# Patient Record
Sex: FEMALE | Race: OTHER | Employment: FULL TIME | URBAN - METROPOLITAN AREA
[De-identification: names, ages, dates, MRNs, and addresses within clinical notes are randomized per-mention and may not be internally consistent; named-entity substitution may affect disease eponyms.]

---

## 2023-08-30 ENCOUNTER — APPOINTMENT (OUTPATIENT)
Dept: GENERAL RADIOLOGY | Facility: HOSPITAL | Age: 63
End: 2023-08-30
Attending: EMERGENCY MEDICINE

## 2023-08-30 ENCOUNTER — APPOINTMENT (OUTPATIENT)
Dept: CT IMAGING | Facility: HOSPITAL | Age: 63
End: 2023-08-30
Attending: EMERGENCY MEDICINE

## 2023-08-30 ENCOUNTER — HOSPITAL ENCOUNTER (INPATIENT)
Facility: HOSPITAL | Age: 63
LOS: 4 days | Discharge: HOME | End: 2023-09-03
Attending: EMERGENCY MEDICINE | Admitting: HOSPITALIST

## 2023-08-30 PROBLEM — A41.9 SEPSIS DUE TO UNDETERMINED ORGANISM (HCC): Status: ACTIVE | Noted: 2023-08-30

## 2023-08-30 PROBLEM — E11.10 DIABETIC KETOACIDOSIS WITHOUT COMA ASSOCIATED WITH TYPE 2 DIABETES MELLITUS (HCC): Status: ACTIVE | Noted: 2023-08-30

## 2023-08-30 PROBLEM — R50.9 FEBRILE ILLNESS: Status: ACTIVE | Noted: 2023-08-30

## 2023-08-30 NOTE — ED INITIAL ASSESSMENT (HPI)
Patient presents to ED with fever, headache, and abd pain since yesterday morning. Patient vomited x1 en route to ED. Per medics, patient received 4mg zofran IV.

## 2023-08-31 NOTE — PLAN OF CARE
Received pt on alert and oriented, on insulin drip. PT on bedrest. Endocrine evaluate pt and transition off drip. Diet order placed. Pt's appetite is fair with nausea resolving. Bed locked and in lowest position, call light within reach. Family at bedside and updated on plan of care. Problem: Patient Centered Care  Goal: Patient preferences are identified and integrated in the patient's plan of care  Description: Interventions:  - What would you like us to know as we care for you?  I am [place]ic speaking  - Provide timely, complete, and accurate information to patient/family  - Incorporate patient and family knowledge, values, beliefs, and cultural backgrounds into the planning and delivery of care  - Encourage patient/family to participate in care and decision-making at the level they choose  - Honor patient and family perspectives and choices  Outcome: Progressing     Problem: Diabetes/Glucose Control  Goal: Glucose maintained within prescribed range  Description: INTERVENTIONS:  - Monitor Blood Glucose as ordered  - Assess for signs and symptoms of hyperglycemia and hypoglycemia  - Administer ordered medications to maintain glucose within target range  - Assess barriers to adequate nutritional intake and initiate nutrition consult as needed  - Instruct patient on self management of diabetes  Outcome: Progressing     Problem: Patient/Family Goals  Goal: Patient/Family Long Term Goal  Description: Patient's Long Term Goal:     Interventions:  -   - See additional Care Plan goals for specific interventions  Outcome: Progressing  Goal: Patient/Family Short Term Goal  Description: Patient's Short Term Goal:     Interventions:   -   - See additional Care Plan goals for specific interventions  Outcome: Progressing     Problem: GASTROINTESTINAL - ADULT  Goal: Minimal or absence of nausea and vomiting  Description: INTERVENTIONS:  - Maintain adequate hydration with IV or PO as ordered and tolerated  - Nasogastric tube to low intermittent suction as ordered  - Evaluate effectiveness of ordered antiemetic medications  - Provide nonpharmacologic comfort measures as appropriate  - Advance diet as tolerated, if ordered  - Obtain nutritional consult as needed  - Evaluate fluid balance  Outcome: Progressing

## 2023-08-31 NOTE — OCCUPATIONAL THERAPY NOTE
Orders received, chart reviewed for OT evaluation. Pt not appropriate to work with today per RN --pt w/ nausea , on insulin drip. Attempt x 2 today.

## 2023-08-31 NOTE — PHYSICAL THERAPY NOTE
PT order received, chart review completed. Attempted to see patient x 2 this morning and afternoon. Per RN pt remains not appropriate for PT evaluation due to nausea, vomiting and remains on insulin drip. Request follow up tomorrow for evaluation.

## 2023-08-31 NOTE — PLAN OF CARE
Patient alert and oriented, Macedonian speaking only. Vital signs stable overnight, afebrile. Patient is on room air, no complaints of shortness of breath. Blood sugars down trending, insulin gtt infusing. K 3.3 this morning, 40 meq being infused.  went home this morning ( 0600) to grab patients medications as him and the patient are unable to recall what medications the patient takes with the dosage, as well as patient pharmacy. Plan of care reviewed, will endorse care. Problem: Patient Centered Care  Goal: Patient preferences are identified and integrated in the patient's plan of care  Description: Interventions:  - What would you like us to know as we care for you?  Home with   - Provide timely, complete, and accurate information to patient/family  - Incorporate patient and family knowledge, values, beliefs, and cultural backgrounds into the planning and delivery of care  - Encourage patient/family to participate in care and decision-making at the level they choose  - Honor patient and family perspectives and choices  Outcome: Progressing     Problem: Diabetes/Glucose Control  Goal: Glucose maintained within prescribed range  Description: INTERVENTIONS:  - Monitor Blood Glucose as ordered  - Assess for signs and symptoms of hyperglycemia and hypoglycemia  - Administer ordered medications to maintain glucose within target range  - Assess barriers to adequate nutritional intake and initiate nutrition consult as needed  - Instruct patient on self management of diabetes  Outcome: Progressing

## 2023-08-31 NOTE — H&P
[facility]    PATIENT'S NAME: Silvio Romero   ATTENDING PHYSICIAN: Serena Mcclendon. Jil Gallegos MD   PATIENT ACCOUNT#:   [id number]    LOCATION:  [address]  MEDICAL RECORD #:   [id number]       YOB: 1960  ADMISSION DATE:       08/30/2023    HISTORY AND PHYSICAL EXAMINATION    CHIEF COMPLAINT:  Diabetic ketoacidosis, fever, rule out infectious etiology. HISTORY OF PRESENT ILLNESS:  Patient is a 14-year-old  female with history of diabetes mellitus type 2 and hypertension, presented to the emergency department for evaluation of intractable nausea, vomiting, and fever since last night. Upon arrival to the emergency room, CBC was unremarkable. Chemistry showed bicarbonate 7, potassium 3.3, sodium 135, glucose 400. GFR is 59. Calculated osmolality of 298. Liver function tests were roughly unremarkable except for mildly elevated alkaline phosphatase of 144. Bilirubin value is normal.  No prior labs to compare. Chest x-ray showed possible small right infrahilar opacity such as from atelectasis or infection. CT scan of the abdomen still pending. Started on IV fluids and insulin drip. Urinalysis still pending. PAST MEDICAL HISTORY:  Obesity and diabetes mellitus type 2. Denies any other medical problems. PAST SURGICAL HISTORY:  Right knee arthroscopic procedure. MEDICATIONS:  She only takes metformin. FAMILY HISTORY:  Positive for diabetes. She has daughter with cholecystectomy. SOCIAL HISTORY:  No tobacco, alcohol, or drug use. Lives with her family. Independent for basic activities of daily living. REVIEW OF SYSTEMS:  Patient is a poor historian, but she reports intractable nausea, vomiting with dry heaving since yesterday with polydipsia and polyuria. She insists that her symptoms are only 3days old. She had fever and chills. When I asked her about abdominal pain, she denies it. No dysuria. Other 12-point review of systems is negative. PHYSICAL EXAMINATION:    GENERAL:  Alert and oriented to time, place, and person. Moderate distress. VITAL SIGNS:  Temperature 103.2; pulse 122, sinus tachycardia; respiratory rate 29; blood pressure 158/60, pulse ox 94% on room air. HEENT:  Atraumatic. Oropharynx clear. Dry mucous membranes. Normal hard and soft palate. Eyes:  Anicteric sclerae. NECK:  Supple. No lymphadenopathy. LUNGS:  Clear to auscultation bilaterally. Normal respiratory effort. HEART:  Regular rate, rhythm. S1 and S2 auscultated. No murmur. ABDOMEN:  Soft, nondistended. Mild tenderness noted right upper quadrant area. EXTREMITIES:  No peripheral edema, clubbing, or cyanosis. NEUROLOGIC:  Motor and sensory intact. Cranial nerves II through XII are intact. ASSESSMENT:    1.   Diabetic ketoacidosis. 2.   Dehydration. 3.   Fever, rule out infectious etiology. CT scan of the abdomen and urinalysis were ordered. Rule out cholecystitis. PLAN:  Patient will be admitted to progressive care unit. IV fluids. Urine and blood cultures. CT scan of the abdomen. Endocrinology consult. Pending imaging studies. Further recommendations for consultation. Monitor her hemodynamic status and electrolytes. IV Zosyn.     Dictated By Christie De Leon MD  d: 08/30/2023 18:50:38  t: 08/30/2023 18:55:13  Job [id number]  /

## 2023-09-01 NOTE — PLAN OF CARE
Problem: Patient Centered Care  Goal: Patient preferences are identified and integrated in the patient's plan of care  Description: Interventions:  - Provide timely, complete, and accurate information to patient/family  - Incorporate patient and family knowledge, values, beliefs, and cultural backgrounds into the planning and delivery of care  - Encourage patient/family to participate in care and decision-making at the level they choose  - Honor patient and family perspectives and choices  Outcome: Progressing     Problem: Diabetes/Glucose Control  Goal: Glucose maintained within prescribed range  Description: INTERVENTIONS:  - Monitor Blood Glucose as ordered  - Assess for signs and symptoms of hyperglycemia and hypoglycemia  - Administer ordered medications to maintain glucose within target range  - Assess barriers to adequate nutritional intake and initiate nutrition consult as needed  - Instruct patient on self management of diabetes  Outcome: Progressing     Problem: GASTROINTESTINAL - ADULT  Goal: Minimal or absence of nausea and vomiting  Description: INTERVENTIONS:  - Maintain adequate hydration with IV or PO as ordered and tolerated  - Nasogastric tube to low intermittent suction as ordered  - Evaluate effectiveness of ordered antiemetic medications  - Provide nonpharmacologic comfort measures as appropriate  - Advance diet as tolerated, if ordered  - Obtain nutritional consult as needed  - Evaluate fluid balance  Outcome: Progressing     Vital signs stable during shift, no acute changes. Monitoring blood sugar. Afebrile during shift. IV abx given per orders. Up to chair today, ambulating, tolerating well. Voiding freely. NSR on the monitor. Orders to transfer, awaiting bed. Safety precautions in place, call light within reach. Bed locked and in lowest position. Educated patient on self administering insulin via [address] , patient needs reinforcement on demonstrating understanding.  Patient and family updated and aware of plan via [address] [facility] .

## 2023-09-01 NOTE — PHYSICAL THERAPY NOTE
PHYSICAL THERAPY EVALUATION - INPATIENT     Room Number: 237/237-A  Evaluation Date: 9/1/2023  Type of Evaluation: Initial   Physician Order: PT Eval and Treat    Presenting Problem: DKA  Co-Morbidities : DM II, HTN  Reason for Therapy: Mobility Dysfunction and Discharge Planning    PHYSICAL THERAPY ASSESSMENT     Patient is a 60year old female admitted 8/30/2023 for DKA. RN cleared pt to participate in PT evaluation this morning, coordinated session with OT. Pt received in bed at start, agreeable to participate. Utilized phone  for communication, pt primarily speaks [place] Spouse present throughout session. At baseline pt lives in one level house with spouse and son. She is indep with mobility and ADLs. She does not drive. No falls. Reviewed role of therapy in acute care setting, goals of session, importance of out of bed mobility and safety precautions. Pt receptive to education. Pt  demonstrated bed mobility and functional transfers with supervision. Gait belt donned prior to mobilization. Pt ambulated 15 ft x 2 without device and CGA. Pt with slow and cautious gait pattern. She remained up in chair at end of session, needs in reach and RN aware of session outcome. Pt is close to her baseline level of mobility, anticipate 1-2 therapy sessions more to progress gait. Patient's current functional deficits include activity tolerance and endurance, which are below the patient's pre-admission status. The patient's Approx Degree of Impairment: 28.97% has been calculated based on documentation in the [facility] '6 clicks' Inpatient Basic Mobility Short Form. Research supports that patients with this level of impairment may benefit from home with initial supervision. Patient will benefit from continued IP PT services to address these deficits in preparation for discharge.     DISCHARGE RECOMMENDATIONS  PT Discharge Recommendations:  (initial 24 hr supervision)    PLAN  PT Treatment Plan: Bed mobility; Endurance; Energy conservation;Patient education; Family education;Gait training;Neuromuscular re-educate;Range of motion;Strengthening;Stair training;Transfer training;Balance training  Rehab Potential : Good  Frequency (Obs): 5x/week       PHYSICAL THERAPY MEDICAL/SOCIAL HISTORY     History related to current admission:      Problem List  Principal Problem:    Diabetic ketoacidosis without coma associated with type 2 diabetes mellitus ([address]  Active Problems:    Febrile illness    Sepsis due to undetermined organism [facility])      HOME SITUATION  Home Situation  Type of Home: House  Home Layout: One level  Stairs to Enter : 1  Lives With: Spouse  Drives: No  Patient Owned Equipment: None     Prior Level of [place]: indep mobility and ADLs    SUBJECTIVE  \"I  had a headache before but now its better. \" (Interpreted in [place]ian)    PHYSICAL THERAPY EXAMINATION     OBJECTIVE  Precautions:  needed  Fall Risk: Standard fall risk    WEIGHT BEARING RESTRICTION                PAIN ASSESSMENT  Ratin          COGNITION  Overall Cognitive Status:  WFL - within functional limits    RANGE OF MOTION AND STRENGTH ASSESSMENT  Upper extremity ROM and strength are within functional limits   Lower extremity ROM is within functional limits   Lower extremity strength is within functional limits     BALANCE  Static Sitting: Good  Dynamic Sitting: Good  Static Standing: Good  Dynamic Standing: Fair +      ACTIVITY TOLERANCE  Pulse: 78  Heart Rate Source: Monitor     BP: 138/78  BP Location: Right arm  BP Method: Automatic  Patient Position: Sitting    O2 WALK  Oxygen Therapy  SPO2% on Room Air at Rest: 95    AM-PAC '6-Clicks' INPATIENT SHORT FORM - BASIC MOBILITY  How much difficulty does the patient currently have. ..   Patient Difficulty: Turning over in bed (including adjusting bedclothes, sheets and blankets)?: None   Patient Difficulty: Sitting down on and standing up from a chair with arms (e.g., wheelchair, bedside commode, etc.): None   Patient Difficulty: Moving from lying on back to sitting on the side of the bed?: None   How much help from another person does the patient currently need. .. Help from Another: Moving to and from a bed to a chair (including a wheelchair)?: A Little   Help from Another: Need to walk in hospital room?: A Little   Help from Another: Climbing 3-5 steps with a railing?: A Little     AM-PAC Score:  Raw Score: 21   Approx Degree of Impairment: 28.97%   Standardized Score (AM-PAC Scale): 50.25   CMS Modifier (G-Code): CJ    FUNCTIONAL ABILITY STATUS  Functional Mobility/Gait Assessment  Gait Assistance: Contact guard assist  Distance (ft): 15 ft x 2  Assistive Device: None  Pattern:  (slow, cautious)    Bed Mobility: supervision    Transfers: supervision    Exercise/Education Provided:  Bed mobility  Body mechanics  Energy conservation  Gait training  Neuromuscular re-educate  Posture  ROM  Strengthening  Transfer training    Patient End of Session: Up in chair;Needs met;Call light within reach;RN aware of session/findings; All patient questions and concerns addressed; Family present    CURRENT GOALS    Goals to be met by: 9/15  Patient Goal Patient's self-stated goal is: To go home. Goal #1 Patient is able to demonstrate supine - sit EOB @ level: independent     Goal #1   Current Status    Goal #2 Patient is able to demonstrate transfers EOB to/from Chair/Wheelchair at assistance level: independent with none     Goal #2  Current Status    Goal #3 Patient is able to ambulate 150 feet with assist device: none at assistance level: independent   Goal #3   Current Status            Goal #5 Patient to demonstrate independence with home activity/exercise instructions provided to patient in preparation for discharge.    Goal #5   Current Status    Goal #6    Goal #6  Current Status      Patient Evaluation Complexity Level:  History Low - no personal factors and/or co-morbidities   Examination of body systems Low - addressing 1-2 elements   Clinical Presentation Low - Stable   Clinical Decision Making Low Complexity     Therapeutic Activity: 15 minutes

## 2023-09-01 NOTE — PLAN OF CARE
Patient alert and oriented. Vital signs stable, afebrile. Patient is on room air, no complaints of shortness of breath or chest pain. Potassium being replaced per protocol. PRN tylenol given for headache. No acute events occurred. Family at bedside overnight. Plan of care reviewed, will endorse care. Problem: Patient Centered Care  Goal: Patient preferences are identified and integrated in the patient's plan of care  Description: Interventions:  - What would you like us to know as we care for you?  Icelandic speaking only  - Provide timely, complete, and accurate information to patient/family  - Incorporate patient and family knowledge, values, beliefs, and cultural backgrounds into the planning and delivery of care  - Encourage patient/family to participate in care and decision-making at the level they choose  - Honor patient and family perspectives and choices  Outcome: Progressing     Problem: Diabetes/Glucose Control  Goal: Glucose maintained within prescribed range  Description: INTERVENTIONS:  - Monitor Blood Glucose as ordered  - Assess for signs and symptoms of hyperglycemia and hypoglycemia  - Administer ordered medications to maintain glucose within target range  - Assess barriers to adequate nutritional intake and initiate nutrition consult as needed  - Instruct patient on self management of diabetes  Outcome: Progressing     Problem: GASTROINTESTINAL - ADULT  Goal: Minimal or absence of nausea and vomiting  Description: INTERVENTIONS:  - Maintain adequate hydration with IV or PO as ordered and tolerated  - Nasogastric tube to low intermittent suction as ordered  - Evaluate effectiveness of ordered antiemetic medications  - Provide nonpharmacologic comfort measures as appropriate  - Advance diet as tolerated, if ordered  - Obtain nutritional consult as needed  - Evaluate fluid balance  Outcome: Progressing

## 2023-09-02 NOTE — PLAN OF CARE
Problem: Diabetes/Glucose Control  Goal: Glucose maintained within prescribed range  Description: INTERVENTIONS:  - Monitor Blood Glucose as ordered  - Assess for signs and symptoms of hyperglycemia and hypoglycemia  - Administer ordered medications to maintain glucose within target range  - Assess barriers to adequate nutritional intake and initiate nutrition consult as needed  - Instruct patient on self management of diabetes  Outcome: Progressing     Problem: GASTROINTESTINAL - ADULT  Goal: Minimal or absence of nausea and vomiting  Description: INTERVENTIONS:  - Maintain adequate hydration with IV or PO as ordered and tolerated  - Nasogastric tube to low intermittent suction as ordered  - Evaluate effectiveness of ordered antiemetic medications  - Provide nonpharmacologic comfort measures as appropriate  - Advance diet as tolerated, if ordered  - Obtain nutritional consult as needed  - Evaluate fluid balance  Outcome: Progressing     Problem: RISK FOR INFECTION - ADULT  Goal: Absence of fever/infection during anticipated neutropenic period  Description: INTERVENTIONS  - Monitor WBC  - Administer growth factors as ordered  - Implement neutropenic guidelines  Outcome: Progressing

## 2023-09-03 PROBLEM — R50.9 FEBRILE ILLNESS: Status: RESOLVED | Noted: 2023-08-30 | Resolved: 2023-09-03

## 2023-09-03 PROBLEM — E11.10 DIABETIC KETOACIDOSIS WITHOUT COMA ASSOCIATED WITH TYPE 2 DIABETES MELLITUS (HCC): Status: RESOLVED | Noted: 2023-08-30 | Resolved: 2023-09-03

## 2023-09-03 PROBLEM — A41.9 SEPSIS DUE TO UNDETERMINED ORGANISM (HCC): Status: RESOLVED | Noted: 2023-08-30 | Resolved: 2023-09-03

## 2023-09-03 NOTE — CM/SW NOTE
09/03/23 1300   Discharge disposition   Expected discharge disposition Home or [facility] 178 services after discharge None   Discharge transportation Private car     The patient received a MDO for discharge. The patient will be transported home via private car. The patient has no questions or concerns at this time. SW/CM to remain available for support and/or discharge planning.      Parul JOHNSON, [facility]  Discharge Planner [id number]

## 2023-09-03 NOTE — DISCHARGE SUMMARY
[facility]    Discharge Summary    Tye Means Patient Status:  Inpatient    1960 MRN [id number]   Location [facility] 2W/SW Attending Lexii Stewart MD   Hosp Day # 4 PCP No primary care provider on file. Date of Admission: 2023 Disposition: HOME    Date of Discharge: 9/3/2023    Admitting Diagnosis:   Febrile illness [R50.9]  Sepsis due E. Coli  Diabetic ketoacidosis without coma associated with type 2 diabetes mellitus ([address] [E11.10]    Lace+ Score: 38  59-90 High Risk  29-58 Medium Risk  0-28   Low Risk. TCM Follow-Up Recommendation:  LACE 29-58: Moderate Risk of readmission after discharge from the hospital.      Problem List: Patient Active Problem List:  (none) - all problems resolved or deleted      Physical Exam:   General appearance: alert, appears stated age and cooperative  Pulmonary:  clear to auscultation bilaterally  Cardiovascular: S1, S2 normal, no murmur, click, rub or gallop, regular rate and rhythm  Abdominal: soft, non-tender; bowel sounds normal; no masses,  no organomegaly  Extremities: extremities normal, atraumatic, no cyanosis or edema  Psychiatric: calm      History of Present Illness: Patient is a 43-year-old  female with history of diabetes mellitus type 2 and hypertension, presented to the emergency department for evaluation of intractable nausea, vomiting, and fever since last night. Upon arrival to the emergency room, CBC was unremarkable. Chemistry showed bicarbonate 7, potassium 3.3, sodium 135, glucose 400. GFR is 59. Calculated osmolality of 298. Liver function tests were roughly unremarkable except for mildly elevated alkaline phosphatase of 144. Bilirubin value is normal.  No prior labs to compare. Chest x-ray showed possible small right infrahilar opacity such as from atelectasis or infection. CT scan of the abdomen still pending. Started on IV fluids and insulin drip.   Urinalysis still pending. Hospital Course:     Diabetic ketoacidosis without coma associated with type 2 diabetes mellitus ([facility] [address]  - IV fluids  - Endocrine on consult  - off insulin drip   -Subcutaneous insulin long-acting and short acting. #-Escherichia coli sepsis secondary to pyelonephritis  -Await sensitivities and specificities of blood cultures. -Need to repeat blood cultures to confirm clearance  -Consultation for length of treatment recommendations  -received 3 days IV Zosyn   - received one dose CTX  - dischage on levoflox 500 mg BID for 5 days. HTN  -continue home meds     DM2  - SSI low dose  - accuchecks AC and HS  - Levemir 40 units daily  - insulin drip to be weaned  - endocrine recs appreciated     Consultations: endocrine/ID    Procedures: none    Complications: none    Discharge Condition: Good    Discharge Medications:      Discharge Medications        START taking these medications        Instructions Prescription details   levoFLOXacin 500 MG Tabs  Commonly known as: Levaquin      Take 1 tablet (500 mg total) by mouth daily for 10 days. Stop taking on: September 13, 2023  Quantity: 5 tablet  Refills: 0            CHANGE how you take these medications        Instructions Prescription details   atorvastatin 20 MG Tabs  Commonly known as: Lipitor  What changed: Another medication with the same name was removed. Continue taking this medication, and follow the directions you see here. Take 1 tablet (20 mg total) by mouth nightly. Refills: 0     metFORMIN HCl 1000 MG Tabs  Commonly known as: GLUCOPHAGE  What changed: Another medication with the same name was removed. Continue taking this medication, and follow the directions you see here. Take 1 tablet (1,000 mg total) by mouth 2 (two) times daily with meals. Refills: 0            CONTINUE taking these medications        Instructions Prescription details   aspirin 325 MG Tabs      Take 1 tablet (325 mg total) by mouth daily.    Refills: 0 valsartan 320 MG Tabs  Commonly known as: Diovan      Take 1 tablet (320 mg total) by mouth daily. Refills: 0               Where to Get Your Medications        These medications were sent to [address], [phone], [address] [place] [address], [place] [id number]      Phone: [phone]   levoFLOXacin 500 MG Tabs         Follow up Visits:  Follow-up with in 1 week    Follow up Labs: none     Other Discharge Instructions: none    Georganne Romberg, MD  9/3/2023  1:22 PM    > 35 min

## 2023-09-03 NOTE — PLAN OF CARE
Pt alert and oriented. PRN tylenol for headache. Prn hydralazine given per Dr. Tessa Joshi. Bed locked and in lowest position. Call light within reach.      Problem: Patient Centered Care  Goal: Patient preferences are identified and integrated in the patient's plan of care  Description: Interventions:  - What would you like us to know as we care for you?   - Provide timely, complete, and accurate information to patient/family  - Incorporate patient and family knowledge, values, beliefs, and cultural backgrounds into the planning and delivery of care  - Encourage patient/family to participate in care and decision-making at the level they choose  - Honor patient and family perspectives and choices  Outcome: Progressing     Problem: Diabetes/Glucose Control  Goal: Glucose maintained within prescribed range  Description: INTERVENTIONS:  - Monitor Blood Glucose as ordered  - Assess for signs and symptoms of hyperglycemia and hypoglycemia  - Administer ordered medications to maintain glucose within target range  - Assess barriers to adequate nutritional intake and initiate nutrition consult as needed  - Instruct patient on self management of diabetes  Outcome: Progressing     Problem: GASTROINTESTINAL - ADULT  Goal: Minimal or absence of nausea and vomiting  Description: INTERVENTIONS:  - Maintain adequate hydration with IV or PO as ordered and tolerated  - Nasogastric tube to low intermittent suction as ordered  - Evaluate effectiveness of ordered antiemetic medications  - Provide nonpharmacologic comfort measures as appropriate  - Advance diet as tolerated, if ordered  - Obtain nutritional consult as needed  - Evaluate fluid balance  Outcome: Progressing     Problem: RISK FOR INFECTION - ADULT  Goal: Absence of fever/infection during anticipated neutropenic period  Description: INTERVENTIONS  - Monitor WBC  - Administer growth factors as ordered  - Implement neutropenic guidelines  Outcome: Progressing

## 2023-09-03 NOTE — PLAN OF CARE
Problem: Patient Centered Care  Goal: Patient preferences are identified and integrated in the patient's plan of care  Description: Interventions:  - What would you like us to know as we care for you?   - Provide timely, complete, and accurate information to patient/family  - Incorporate patient and family knowledge, values, beliefs, and cultural backgrounds into the planning and delivery of care  - Encourage patient/family to participate in care and decision-making at the level they choose  - Honor patient and family perspectives and choices  Outcome: Completed     Problem: Diabetes/Glucose Control  Goal: Glucose maintained within prescribed range  Description: INTERVENTIONS:  - Monitor Blood Glucose as ordered  - Assess for signs and symptoms of hyperglycemia and hypoglycemia  - Administer ordered medications to maintain glucose within target range  - Assess barriers to adequate nutritional intake and initiate nutrition consult as needed  - Instruct patient on self management of diabetes  Outcome: Completed     Problem: Patient/Family Goals  Goal: Patient/Family Long Term Goal  Description: Patient's Long Term Goal:     Interventions:  -   - See additional Care Plan goals for specific interventions  Outcome: Completed  Goal: Patient/Family Short Term Goal  Description: Patient's Short Term Goal:     Interventions:   -   - See additional Care Plan goals for specific interventions  Outcome: Completed     Problem: GASTROINTESTINAL - ADULT  Goal: Minimal or absence of nausea and vomiting  Description: INTERVENTIONS:  - Maintain adequate hydration with IV or PO as ordered and tolerated  - Nasogastric tube to low intermittent suction as ordered  - Evaluate effectiveness of ordered antiemetic medications  - Provide nonpharmacologic comfort measures as appropriate  - Advance diet as tolerated, if ordered  - Obtain nutritional consult as needed  - Evaluate fluid balance  Outcome: Completed     Problem: RISK FOR INFECTION - ADULT  Goal: Absence of fever/infection during anticipated neutropenic period  Description: INTERVENTIONS  - Monitor WBC  - Administer growth factors as ordered  - Implement neutropenic guidelines  Outcome: Completed

## 2023-09-05 NOTE — PROGRESS NOTES
Attempted to reach the patient to complete a [facility]-Hospital FU call. Left a message, with the assistance of the language line, for the pt to call the NCM back at, [phone].

## 2023-09-06 NOTE — PROGRESS NOTES
Initial Post Discharge Follow Up   Discharge Date: 9/3/23  Contact Date: 2023    Consent Verification:  Assessment Completed With: Other: son Nathaniel Mane received per patient?  verbal  HIPAA Verified? No    Discharge Dx:     DKA    General:   How have you been since your discharge from the hospital? Son reports pt feeling better, since hospital discharge--tolerating diet, staying hydrated, ambulating independently. Patient denies fever, chills, nausea, vomiting, diarrhea, chest pain or shortness of breath at this time. Son states [facility] pharmacy unable to release Levaquin or Jardiance, d/t incorrect  and address on Rx. Medications:   Current Outpatient Medications   Medication Sig Dispense Refill    levoFLOXacin 500 MG Oral Tab Take 1 tablet (500 mg total) by mouth daily for 10 days. 5 tablet 0    Empagliflozin (JARDIANCE) 25 MG Oral Tab Take 1 tablet by mouth daily. 30 tablet 1    metFORMIN HCl 1000 MG Oral Tab Take 1 tablet (1,000 mg total) by mouth 2 (two) times daily with meals. valsartan 320 MG Oral Tab Take 1 tablet (320 mg total) by mouth daily. atorvastatin 20 MG Oral Tab Take 1 tablet (20 mg total) by mouth nightly. aspirin 325 MG Oral Tab Take 1 tablet (325 mg total) by mouth daily.       (NCM)  Were there any medication changes noted on AVS?  yes    START taking:  Jardiance (Empagliflozin)  levoFLOXacin (Levaquin)  CHANGE how you take:  atorvastatin (Lipitor)  metFORMIN HCl (GLUCOPHAGE)       Needs post D/C:   Now that you are home, are there any needs or concerns you need addressed before your next visit with your PCP?  (DME, meds, disease concerns, Etc): Yes--[facility] will not release Levaquin or Jardiance, d/t incorrect  and address in EMR    Follow up appointments:    Dr. Saurav Wakefield MD  Geriatrician in [facility]  Address: [address] [facility] , [address] [facility], [facility], [address]  Closes soon ? 6? PM ? Opens 10? AM Thu  Phone: [phone]    Follow-up Information    Follow up With Specialties Details Why Contact Info   Mandy Harrison MD ENDOCRINOLOGY Follow up in 1 week(s)  [address] [place] [id number]          Interventions by NCM:  Son states [facility] will not release Jardiance or Levaquin, as there is wrong  and Olu Rice confirms  is 2023 and address is [address] He confirms pt's PCP is Dr. Royce Sams. This NCM spoke with Nelly Horton at pt's PCP office--she confirms pt's  is 1961 and address is [address] Spoke with Wayne Mayers at [employer] and verified correct  and address--this NCM also confirmed via scanned ID information in Epic and updated correct  and Pravin He confirms she will get both Levaquin and Jardiance ready for . This NCM notified jm Luoms to f/u with [facility] for both Rx and he will call for PCP appt and endocrinology appt, as advised--relayed Dr. Karely Saldaña name and # to make appt. Patient aware when to contact PCP/specialists and when to seek emergency care. No further questions/concerns at this time.

## 2023-09-06 NOTE — PROGRESS NOTES
Robyn SANCHEZ# [id number]     Left message on mailbox for pt to call [facility] back for TCM. [facility] contact information [phone] included in message.

## 2023-09-11 NOTE — PAYOR COMM NOTE
--------------  DISCHARGE REVIEW    Payor: [facility]  Subscriber #:  [id number]  Authorization Number: [id number]    Admit date: 23  Admit time:   8:59 PM  Discharge Date: 9/3/2023  2:46 PM     Admitting Physician: Donta Loaiza MD  Attending Physician:  No att. providers found  Primary Care Physician: Kelli Mike          Discharge Summary Notes        Discharge Summary signed by Kady Thompson MD at 9/3/2023  3:53 PM       Author: Kady Thompson MD Specialty: HOSPITALIST Author Type: Physician    Filed: 9/3/2023  3:53 PM Date of Service: 9/3/2023  1:22 PM Status: Signed    : Kady Thompson MD (Physician)         [facility]    Discharge Summary    Chauncey Ambrose Patient Status:  Inpatient    1960 MRN [id number]   Location [facility] 2W/SW Attending Kady Thompson MD   Hosp Day # 4 PCP No primary care provider on file. Date of Admission: 2023 Disposition: HOME    Date of Discharge: 9/3/2023    Admitting Diagnosis:   Febrile illness [R50.9]  Sepsis due E. Coli  Diabetic ketoacidosis without coma associated with type 2 diabetes mellitus ([facility] [address] [E11.10]    Lace+ Score: 38  59-90 High Risk  29-58 Medium Risk  0-28   Low Risk. TCM Follow-Up Recommendation:  LACE 29-58:  Moderate Risk of readmission after discharge from the hospital.      Problem List: Patient Active Problem List:  (none) - all problems resolved or deleted      Physical Exam:   General appearance: alert, appears stated age and cooperative  Pulmonary:  clear to auscultation bilaterally  Cardiovascular: S1, S2 normal, no murmur, click, rub or gallop, regular rate and rhythm  Abdominal: soft, non-tender; bowel sounds normal; no masses,  no organomegaly  Extremities: extremities normal, atraumatic, no cyanosis or edema  Psychiatric: calm      History of Present Illness: Patient is a 58-year-old  female with history of diabetes mellitus type 2 and hypertension, presented to the emergency department for evaluation of intractable nausea, vomiting, and fever since last night. Upon arrival to the emergency room, CBC was unremarkable. Chemistry showed bicarbonate 7, potassium 3.3, sodium 135, glucose 400. GFR is 59. Calculated osmolality of 298. Liver function tests were roughly unremarkable except for mildly elevated alkaline phosphatase of 144. Bilirubin value is normal.  No prior labs to compare. Chest x-ray showed possible small right infrahilar opacity such as from atelectasis or infection. CT scan of the abdomen still pending. Started on IV fluids and insulin drip. Urinalysis still pending. Hospital Course:     Diabetic ketoacidosis without coma associated with type 2 diabetes mellitus ([facility]  - IV fluids  - Endocrine on consult  - off insulin drip   -Subcutaneous insulin long-acting and short acting. #-Escherichia coli sepsis secondary to pyelonephritis  -Await sensitivities and specificities of blood cultures. -Need to repeat blood cultures to confirm clearance  -Consultation for length of treatment recommendations  -received 3 days IV Zosyn   - received one dose CTX  - dischage on levoflox 500 mg BID for 5 days. HTN  -continue home meds     DM2  - SSI low dose  - accuchecks AC and HS  - Levemir 40 units daily  - insulin drip to be weaned  - endocrine recs appreciated     Consultations: endocrine/ID    Procedures: none    Complications: none    Discharge Condition: Good    Discharge Medications:      Discharge Medications        START taking these medications        Instructions Prescription details   levoFLOXacin 500 MG Tabs  Commonly known as: Levaquin      Take 1 tablet (500 mg total) by mouth daily for 10 days.    Stop taking on: September 13, 2023  Quantity: 5 tablet  Refills: 0            CHANGE how you take these medications        Instructions Prescription details   atorvastatin 20 MG Tabs  Commonly known as: Lipitor  What changed: Another medication with the same name was removed. Continue taking this medication, and follow the directions you see here. Take 1 tablet (20 mg total) by mouth nightly. Refills: 0     metFORMIN HCl 1000 MG Tabs  Commonly known as: GLUCOPHAGE  What changed: Another medication with the same name was removed. Continue taking this medication, and follow the directions you see here. Take 1 tablet (1,000 mg total) by mouth 2 (two) times daily with meals. Refills: 0            CONTINUE taking these medications        Instructions Prescription details   aspirin 325 MG Tabs      Take 1 tablet (325 mg total) by mouth daily. Refills: 0     valsartan 320 MG Tabs  Commonly known as: Diovan      Take 1 tablet (320 mg total) by mouth daily. Refills: 0               Where to Get Your Medications        These medications were sent to [address], [phone], [address] [place] [id number]      Phone: [phone]   levoFLOXacin 500 MG Tabs         Follow up Visits:  Follow-up with in 1 week    Follow up Labs: none     Other Discharge Instructions: none    Yarelis Galloway MD  9/3/2023  1:22 PM    > 35 min     Electronically signed by Bridget Young MD on 9/3/2023  3:53 PM         REVIEWER COMMENTS

## 2023-09-20 NOTE — PROGRESS NOTES
Name: Toni Salmon  Date: 9/20/2023    Referring Physician: No ref. provider found    HISTORY OF PRESENT ILLNESS   Toni Salmon is a 60year old female who presents for evaluation and management of type 2 diabetes. She was diagnosed with diabetes about 10 years ago. Blood Glucose Today: 169  HbA1C or glycohemoglobin  8.9 on 8/30/23  Type 1 or Type 2?: Type 2  Medications for DM: Jardiance 25mg; metformin 1g PO bid   Checking 1 times per day  Fasting- 225, 150s  Episodes of hypoglycemia: none    Dietary compliance: eats healthy  Breakfast: coffee, sandwich with turkey and lettuce  Dinner: rice and beans    Exercise: she is very active at work    Polyuria/polydipsia: none    Blurred vision: none    Flu Vaccine This Season: yes    Covid Vaccine: yes    REVIEW OF SYSTEMS  CV: Cardiovascular disease present: none         Hypertension present: at goal, on meds         Hyperlipidemia present: unknown         Peripheral Vascular Disease present: none    : Nephropathy present: MAC: unknown Creatinine: 0.69     Neuro: Neuropathy present: none    Eyes: Diabetic retinopathy present: none            Most recent visit to eye doctor in last 12 months: recently    Skin: Infection or ulceration: none    Osteoporosis/ Osteopenia: none Vitamin D: unknown    Thyroid disease: none TSH: unknown    Medications:     Current Outpatient Medications:     Empagliflozin (JARDIANCE) 25 MG Oral Tab, Take 1 tablet by mouth daily. , Disp: 30 tablet, Rfl: 1    metFORMIN HCl 1000 MG Oral Tab, Take 1 tablet (1,000 mg total) by mouth 2 (two) times daily with meals. , Disp: , Rfl:     valsartan 320 MG Oral Tab, Take 1 tablet (320 mg total) by mouth daily. , Disp: , Rfl:     atorvastatin 20 MG Oral Tab, Take 1 tablet (20 mg total) by mouth nightly., Disp: , Rfl:     aspirin 325 MG Oral Tab, Take 1 tablet (325 mg total) by mouth daily. , Disp: , Rfl:      Allergies:   No Known Allergies    Social History:   Social History     Socioeconomic History    Marital status:    Tobacco Use    Smoking status: Never   Substance and Sexual Activity    Alcohol use: Never       Medical History:   Past Medical History:   Diagnosis Date    Diabetes ([address]        Surgical history:   No past surgical history on file. PHYSICAL EXAM   09/20/23  1610   BP: 141/75   Pulse: 61   Weight: 182 lb (82.6 kg)   Height: 5' 5\" (1.651 m)       General Appearance:  alert, well developed, in no acute distress  Eyes:  normal conjunctivae, sclera. , normal sclera and normal pupils  Neuro:  sensory grossly intact and motor grossly intact, normal monofilament exam  Psychiatric:  oriented to time, self, and place  Nutritional:  no abnormal weight gain or loss    Lab Data:   Lab Results   Component Value Date     (H) 08/30/2023    A1C 8.9 (H) 08/30/2023     Lab Results   Component Value Date     (H) 09/03/2023    BUN 10 09/03/2023    BUNCREA 14.5 09/03/2023    CREATSERUM 0.69 09/03/2023    ANIONGAP 7 09/03/2023    CA 9.4 09/03/2023    OSMOCALC 299 (H) 09/03/2023    ALKPHO 104 08/31/2023    AST 14 (L) 08/31/2023    ALT 21 08/31/2023    BILT 0.4 08/31/2023    TP 6.0 (L) 08/31/2023    ALB 2.4 (L) 08/31/2023    GLOBULIN 3.6 08/31/2023     09/03/2023    K 3.4 (L) 09/03/2023     09/03/2023    CO2 25.0 09/03/2023     No results found for: \"CHOLEST\", \"TRIG\", \"HDL\", \"LDL\", \"VLDL\", \"TCHDLRATIO\", \"NONHDLC\", \"CHOLHDLRATIO\", \"CALCNONHDL\"  No results found for: \"MALBP\", \"CREUR\", \"CREAURINE\", \"MIALBURINE\", \"MCRRATIOUR\", \"MALBCRECALC\", \"MICROALBUMIN\", \"CREAUR\", \"MALBCREACALC\"      ASSESSMENT/PLAN:  This is a 60year-old woman here for evaluation and management of uncontrolled type 2 diabetes. We discussed the ABCs of DM.     1.) Hyperglycemia Management- We discussed the importance of glycemic control to prevent complications of diabetes. We discussed the importance of SBGM. I offered and provided patient education materials and offered a blood glucose log book.    - Continue Jardiance 25mg PO qday and metformin 1g PO bid  - Start checking blood sugars fasting and two hours after biggest meal    2.) Management of Diabetic Complications- We discussed the complications of diabetes include retinopathy, neuropathy, nephropathy and cardiovascular disease. - Ophtho- she is up to date  - Flu and Covid vaccine- up to date  - BP- at goal, on meds  - Lipids- check in 3 months  - MAC- check in 3 months  - CMP- check in 3 months  - Neuropathy- none  - CAD- none    3.) Lifestyle Management for Diabetes- We discussed importance of a low CHO diet, and recommend 45gm per meal or 135gm per day. We discussed the importance of trying to follow a Mediterranean diet, with an emphasis on vegetables at every meal, with lots whole grains, and protein from either plant-based sources, or poultry and fish. - Diet- she will try and eat more healthy  - Exercise- she will try and walk 10 minutes each day    Return to clinic in 3 months    Prior to this encounter, I spent over 15 minutes with preparing for the visit, including reviewing documents from other specialties as well as from PCP and going over test results. During the face to face encounter, I spent an additional 15 minutes which were determined for follow-up. Greater than 50% of the time was spent in counseling, anticipatory guidance, and coordination of care. Patient concerns were answered to the best of my knowledge. 9/20/2023  Zeina Alvarado MD